# Patient Record
Sex: MALE | Race: WHITE | NOT HISPANIC OR LATINO | URBAN - METROPOLITAN AREA
[De-identification: names, ages, dates, MRNs, and addresses within clinical notes are randomized per-mention and may not be internally consistent; named-entity substitution may affect disease eponyms.]

---

## 2024-09-12 ENCOUNTER — LAB OUTSIDE AN ENCOUNTER (OUTPATIENT)
Dept: URBAN - METROPOLITAN AREA CLINIC 50 | Facility: CLINIC | Age: 69
End: 2024-09-12

## 2024-09-12 ENCOUNTER — OFFICE VISIT (OUTPATIENT)
Dept: URBAN - METROPOLITAN AREA CLINIC 50 | Facility: CLINIC | Age: 69
End: 2024-09-12
Payer: MEDICARE

## 2024-09-12 ENCOUNTER — DASHBOARD ENCOUNTERS (OUTPATIENT)
Age: 69
End: 2024-09-12

## 2024-09-12 VITALS
HEART RATE: 76 BPM | BODY MASS INDEX: 25.06 KG/M2 | TEMPERATURE: 97.6 F | WEIGHT: 185 LBS | HEIGHT: 72 IN | SYSTOLIC BLOOD PRESSURE: 129 MMHG | DIASTOLIC BLOOD PRESSURE: 81 MMHG

## 2024-09-12 DIAGNOSIS — Z12.11 COLON CANCER SCREENING: ICD-10-CM

## 2024-09-12 DIAGNOSIS — I49.9 IRREGULAR HEART RHYTHM: ICD-10-CM

## 2024-09-12 PROBLEM — 361137007: Status: ACTIVE | Noted: 2024-09-12

## 2024-09-12 PROBLEM — 711150003: Status: ACTIVE | Noted: 2024-09-12

## 2024-09-12 PROCEDURE — 99243 OFF/OP CNSLTJ NEW/EST LOW 30: CPT | Performed by: INTERNAL MEDICINE

## 2024-09-12 PROCEDURE — 99203 OFFICE O/P NEW LOW 30 MIN: CPT | Performed by: INTERNAL MEDICINE

## 2024-09-12 NOTE — PHYSICAL EXAM EYES:
Conjuntivae and eyelids appear normal, Sclerae : White without injection Glaucoma suspect of both eyes  Discussed with patient that he is a glaucoma suspect based on increased cupping of the optic nerves in both eyes.   Will not start medication, but will continue to observe.  Patient verbalized understanding.    Will see patient in 1 year for a complete exam and photos    Pseudophakia of both eyes  New glasses Rx given today, update as needed    Floaters in visual field, bilateral   There is no evidence of retinal pathology.  All signs and symptoms of retinal detachment/tears explained in detail.    Patient instructed to call the office if they experience increase in floaters, increase in flashes of light, loss of vision or curtain or veil effect.

## 2024-09-12 NOTE — HPI-TODAY'S VISIT:
This patient was referred by Dr. Jamal Toscano for an evaluation of colon cancer screening.  A copy of this will be sent to the referring provider.  68 y.o. WM, , 1 child, retired Need a colonoscopy - last ~ 13 yrs ago Bowels very regular and frequent w/o issue - rarely constipated No blood in stool  Recent physical - abn ekg - started on eliquis - going to see cardiologist end of this mo

## 2024-11-08 ENCOUNTER — TELEPHONE ENCOUNTER (OUTPATIENT)
Dept: URBAN - METROPOLITAN AREA CLINIC 50 | Facility: CLINIC | Age: 69
End: 2024-11-08